# Patient Record
Sex: MALE | Race: WHITE | NOT HISPANIC OR LATINO | Employment: FULL TIME | URBAN - METROPOLITAN AREA
[De-identification: names, ages, dates, MRNs, and addresses within clinical notes are randomized per-mention and may not be internally consistent; named-entity substitution may affect disease eponyms.]

---

## 2019-09-24 ENCOUNTER — HOSPITAL ENCOUNTER (EMERGENCY)
Facility: OTHER | Age: 69
Discharge: HOME | End: 2019-09-24
Attending: EMERGENCY MEDICINE
Payer: COMMERCIAL

## 2019-09-24 VITALS
BODY MASS INDEX: 32.48 KG/M2 | WEIGHT: 214.31 LBS | RESPIRATION RATE: 20 BRPM | HEART RATE: 79 BPM | OXYGEN SATURATION: 95 % | DIASTOLIC BLOOD PRESSURE: 94 MMHG | SYSTOLIC BLOOD PRESSURE: 171 MMHG | HEIGHT: 68 IN | TEMPERATURE: 98 F

## 2019-09-24 DIAGNOSIS — I10 HYPERTENSION: ICD-10-CM

## 2019-09-24 DIAGNOSIS — I16.0 HYPERTENSIVE URGENCY: Primary | ICD-10-CM

## 2019-09-24 LAB
ALBUMIN SERPL BCP-MCNC: 3.8 G/DL (ref 3.5–5.2)
ALP SERPL-CCNC: 72 U/L (ref 55–135)
ALT SERPL W/O P-5'-P-CCNC: 17 U/L (ref 10–44)
ANION GAP SERPL CALC-SCNC: 13 MMOL/L (ref 8–16)
AST SERPL-CCNC: 14 U/L (ref 10–40)
BASOPHILS # BLD AUTO: 0.08 K/UL (ref 0–0.2)
BASOPHILS NFR BLD: 0.9 % (ref 0–1.9)
BILIRUB SERPL-MCNC: 0.7 MG/DL (ref 0.1–1)
BILIRUB UR QL STRIP: NEGATIVE
BNP SERPL-MCNC: 18 PG/ML (ref 0–99)
BUN SERPL-MCNC: 27 MG/DL (ref 8–23)
CALCIUM SERPL-MCNC: 9.6 MG/DL (ref 8.7–10.5)
CHLORIDE SERPL-SCNC: 103 MMOL/L (ref 95–110)
CLARITY UR: CLEAR
CO2 SERPL-SCNC: 23 MMOL/L (ref 23–29)
COLOR UR: YELLOW
CREAT SERPL-MCNC: 1.1 MG/DL (ref 0.5–1.4)
DIFFERENTIAL METHOD: ABNORMAL
EOSINOPHIL # BLD AUTO: 0.2 K/UL (ref 0–0.5)
EOSINOPHIL NFR BLD: 2.4 % (ref 0–8)
ERYTHROCYTE [DISTWIDTH] IN BLOOD BY AUTOMATED COUNT: 12.6 % (ref 11.5–14.5)
EST. GFR  (AFRICAN AMERICAN): >60 ML/MIN/1.73 M^2
EST. GFR  (NON AFRICAN AMERICAN): >60 ML/MIN/1.73 M^2
GLUCOSE SERPL-MCNC: 139 MG/DL (ref 70–110)
GLUCOSE UR QL STRIP: NEGATIVE
HCT VFR BLD AUTO: 50.1 % (ref 40–54)
HGB BLD-MCNC: 16.2 G/DL (ref 14–18)
HGB UR QL STRIP: ABNORMAL
IMM GRANULOCYTES # BLD AUTO: 0.07 K/UL (ref 0–0.04)
IMM GRANULOCYTES NFR BLD AUTO: 0.8 % (ref 0–0.5)
KETONES UR QL STRIP: NEGATIVE
LEUKOCYTE ESTERASE UR QL STRIP: NEGATIVE
LYMPHOCYTES # BLD AUTO: 1.8 K/UL (ref 1–4.8)
LYMPHOCYTES NFR BLD: 21.1 % (ref 18–48)
MCH RBC QN AUTO: 29.7 PG (ref 27–31)
MCHC RBC AUTO-ENTMCNC: 32.3 G/DL (ref 32–36)
MCV RBC AUTO: 92 FL (ref 82–98)
MICROSCOPIC COMMENT: ABNORMAL
MONOCYTES # BLD AUTO: 0.9 K/UL (ref 0.3–1)
MONOCYTES NFR BLD: 9.8 % (ref 4–15)
NEUTROPHILS # BLD AUTO: 5.7 K/UL (ref 1.8–7.7)
NEUTROPHILS NFR BLD: 65 % (ref 38–73)
NITRITE UR QL STRIP: NEGATIVE
NRBC BLD-RTO: 0 /100 WBC
PH UR STRIP: 6 [PH] (ref 5–8)
PLATELET # BLD AUTO: 222 K/UL (ref 150–350)
PMV BLD AUTO: 9.6 FL (ref 9.2–12.9)
POTASSIUM SERPL-SCNC: 4.5 MMOL/L (ref 3.5–5.1)
PROT SERPL-MCNC: 7.3 G/DL (ref 6–8.4)
PROT UR QL STRIP: ABNORMAL
RBC # BLD AUTO: 5.45 M/UL (ref 4.6–6.2)
RBC #/AREA URNS HPF: 5 /HPF (ref 0–4)
SODIUM SERPL-SCNC: 139 MMOL/L (ref 136–145)
SP GR UR STRIP: 1.02 (ref 1–1.03)
SQUAMOUS #/AREA URNS HPF: 1 /HPF
TROPONIN I SERPL DL<=0.01 NG/ML-MCNC: <0.006 NG/ML (ref 0–0.03)
TSH SERPL DL<=0.005 MIU/L-ACNC: 1.31 UIU/ML (ref 0.4–4)
URN SPEC COLLECT METH UR: ABNORMAL
UROBILINOGEN UR STRIP-ACNC: NEGATIVE EU/DL
WBC # BLD AUTO: 8.74 K/UL (ref 3.9–12.7)

## 2019-09-24 PROCEDURE — 93010 ELECTROCARDIOGRAM REPORT: CPT | Mod: ,,, | Performed by: INTERNAL MEDICINE

## 2019-09-24 PROCEDURE — 93005 ELECTROCARDIOGRAM TRACING: CPT

## 2019-09-24 PROCEDURE — 84484 ASSAY OF TROPONIN QUANT: CPT

## 2019-09-24 PROCEDURE — 80053 COMPREHEN METABOLIC PANEL: CPT

## 2019-09-24 PROCEDURE — 99284 EMERGENCY DEPT VISIT MOD MDM: CPT | Mod: 25

## 2019-09-24 PROCEDURE — 84443 ASSAY THYROID STIM HORMONE: CPT

## 2019-09-24 PROCEDURE — 85025 COMPLETE CBC W/AUTO DIFF WBC: CPT

## 2019-09-24 PROCEDURE — 93010 EKG 12-LEAD: ICD-10-PCS | Mod: ,,, | Performed by: INTERNAL MEDICINE

## 2019-09-24 PROCEDURE — 36415 COLL VENOUS BLD VENIPUNCTURE: CPT

## 2019-09-24 PROCEDURE — 25000003 PHARM REV CODE 250: Performed by: EMERGENCY MEDICINE

## 2019-09-24 PROCEDURE — 81000 URINALYSIS NONAUTO W/SCOPE: CPT

## 2019-09-24 PROCEDURE — 83880 ASSAY OF NATRIURETIC PEPTIDE: CPT

## 2019-09-24 RX ORDER — ACETAMINOPHEN 325 MG/1
650 TABLET ORAL
Status: COMPLETED | OUTPATIENT
Start: 2019-09-24 | End: 2019-09-24

## 2019-09-24 RX ORDER — LABETALOL HYDROCHLORIDE 5 MG/ML
10 INJECTION, SOLUTION INTRAVENOUS
Status: DISCONTINUED | OUTPATIENT
Start: 2019-09-24 | End: 2019-09-24

## 2019-09-24 RX ORDER — CLONIDINE HYDROCHLORIDE 0.1 MG/1
0.1 TABLET ORAL
Status: COMPLETED | OUTPATIENT
Start: 2019-09-24 | End: 2019-09-24

## 2019-09-24 RX ORDER — AMLODIPINE BESYLATE 5 MG/1
5 TABLET ORAL DAILY
Qty: 30 TABLET | Refills: 0 | Status: SHIPPED | OUTPATIENT
Start: 2019-09-24 | End: 2020-09-23

## 2019-09-24 RX ADMIN — ACETAMINOPHEN 650 MG: 325 TABLET, FILM COATED ORAL at 09:09

## 2019-09-24 RX ADMIN — CLONIDINE HYDROCHLORIDE 0.1 MG: 0.1 TABLET ORAL at 09:09

## 2019-09-24 NOTE — ED NOTES
Pt to ED with c/o HTN since this AM. PMH of high cholesterol, family history of heart disease. Pt reporting intermittent R sided neck pain with radiation to R elbow for several weeks. Pain is relieved with rest and hot water, exacerbated with with movement. Pt seen by physical therapy this AM, pt reports becoming diaphoretic. Physical therapist check BP, observed SBP in 220s, sent pt to ED for further evaluation. Pt denies dizziness, weakness, SOB, CP, fever/chills, changes in vision, numbness/tingling. Pt connected to continuous cardiac monitoring, pulse ox, BP cycled. Will continue to monitor.     Two patient identifiers have been checked and are correct.      Appearance: Pt awake, alert & oriented to person, place & time. Pt in no acute distress at present time. Pt is clean and well groomed with clothes appropriately fastened.   Skin: Skin warm, dry & intact. Color consistent with ethnicity. Mucous membranes moist. No breakdown or brusing noted.   Musculoskeletal: Patient moving all extremities well, no obvious swelling or deformities noted. Ambulatory to ED bed with steady and even gait.   Respiratory: Respirations spontaneous, even, and non-labored. Visible chest rise noted. Airway is open and patent. No accessory muscle use noted.   Neurologic: Sensation is intact. Speech is clear and appropriate. Eyes open spontaneously, behavior appropriate to situation, follows commands, facial expression symmetrical, bilateral hand grasp equal and even, purposeful motor response noted.  Cardiac: All peripheral pulses present. No Bilateral lower extremity edema. Cap refill is <3 seconds.  Abdomen: Abdomen soft, non-tender to palpation. Pt is obese.   : Pt reports no dysuria or hematuria.

## 2019-09-24 NOTE — ED NOTES
Pt ambulatory to ED restroom to provide urine sample. Pt educated on clean-catch technique, pt verbalized understanding.

## 2019-09-24 NOTE — ED PROVIDER NOTES
Encounter Date: 9/24/2019    SCRIBE #1 NOTE: Bernie BRIGHT am scribing for, and in the presence of, Dr. Noland.       History     Chief Complaint   Patient presents with    Hypertension     Pt sent to Ed per physical therapy for elevated BPs 220/110, 202/199 & diaphresis. Pt denies dizziness, H/A, acute vision changes & extremity numbness. Pt reports that he was as PT for pinched nerve.     Time seen by provider: 8:41 AM    This is a 69 y.o. male who presents from physical therapy with complaint of diaphoresis and elevated blood pressure this morning. He reports that diaphoresis began after long hot shower this morning and persisted until arrival to physical therapy. At his physical therapy appointment, he had an elevated blood pressure of 220/110 and was sent to this ED prior to start of exercise. He states that he saw Dr. Santizo one week ago with three week hx of right shoulder pain and was diagnosed with pinched nerve and recommended for physical therapy, today was to be his first session. He states that pain is radiating to the right upper arm and the right chest - the chest pain is reproducible when he elevates his right arm above shoulder level. He reports some burning sensation in right arm. He was prescribed Tramadol but has not been taking it. He reports that he has not been sleeping well due to pain. He states that heat helps with shoulder pain. He has been taking Tylenol, Ibuprofen, and Aspirin for pain. He reports no left-sided chest pain or palpitations. He denies fever, chills, SOB, N/V, cough, sore throat, rhinorrhea, urinary sx, irregular bowel movements, leg swelling, lightheadedness, headache, dizziness, and vision changes. He is not on any current medications. He does not smoke or use street drugs. He reports that he drinks occasionally. He has no hx of HTN and denies checking his blood pressure regularly.    The history is provided by the patient.     Review of patient's allergies  indicates:  No Known Allergies  Past Medical History:   Diagnosis Date    Family history of ischemic heart disease (IHD)     Hyperlipidemia     Obesity      Past Surgical History:   Procedure Laterality Date    ANKLE SURGERY      KNEE SURGERY       Family History   Problem Relation Age of Onset    Heart disease Father      Social History     Tobacco Use    Smoking status: Former Smoker     Packs/day: 1.00     Years: 20.00     Pack years: 20.00     Types: Cigarettes     Last attempt to quit: 2009     Years since quittin.8   Substance Use Topics    Alcohol use: Yes     Alcohol/week: 1.7 standard drinks     Types: 2 Standard drinks or equivalent per week    Drug use: Never     Review of Systems   Constitutional: Positive for diaphoresis (resolved). Negative for chills and fever.   HENT: Negative for congestion and sore throat.    Eyes: Negative for visual disturbance.   Respiratory: Negative for cough and shortness of breath.    Cardiovascular: Positive for chest pain (right sided). Negative for palpitations and leg swelling.   Gastrointestinal: Negative for abdominal pain, diarrhea, nausea and vomiting.   Genitourinary: Negative for decreased urine volume, dysuria and frequency.   Musculoskeletal: Positive for arthralgias (right shoulder). Negative for joint swelling, neck pain and neck stiffness.   Skin: Negative for rash and wound.   Neurological: Negative for dizziness, weakness, light-headedness, numbness and headaches.   Psychiatric/Behavioral: Negative for behavioral problems and confusion.       Physical Exam     Initial Vitals [19 0739]   BP Pulse Resp Temp SpO2   (!) 228/102 88 18 98.2 °F (36.8 °C) 97 %      MAP       --         Vitals:    19 0840 19 0932 19 1002 19 1032   BP: (!) 204/95 (!) 222/100 (!) 224/103 (!) 165/80   Pulse: 84 82 81 79   Resp: 18 (!) 21 (!) 24 (!) 23   Temp:       TempSrc:       SpO2: 97% 95% 96% (!) 93%   Weight:       Height:         09/24/19 1132   BP: (!) 171/94   Pulse: 79   Resp: 20   Temp:    TempSrc:    SpO2: 95%   Weight:    Height:      Physical Exam    Nursing note and vitals reviewed.  Constitutional: He appears well-developed and well-nourished. No distress.   HENT:   Head: Normocephalic and atraumatic.   Mouth/Throat: Oropharynx is clear and moist.   Eyes: Conjunctivae and EOM are normal. Pupils are equal, round, and reactive to light.   Neck: Normal range of motion. Neck supple.   Cardiovascular: Normal rate, regular rhythm and normal heart sounds.   No murmur heard.  Pulmonary/Chest: Breath sounds normal. No respiratory distress. He has no wheezes. He has no rhonchi. He has no rales.   Abdominal: Soft. Bowel sounds are normal. There is no tenderness.   Musculoskeletal: Normal range of motion.   Neurological: He is alert and oriented to person, place, and time. He has normal strength. No cranial nerve deficit or sensory deficit.   Skin: Skin is warm and dry. No rash noted.   Psychiatric: He has a normal mood and affect. His behavior is normal.         ED Course   Procedures  Labs Reviewed   CBC W/ AUTO DIFFERENTIAL - Abnormal; Notable for the following components:       Result Value    Immature Granulocytes 0.8 (*)     Immature Grans (Abs) 0.07 (*)     All other components within normal limits   COMPREHENSIVE METABOLIC PANEL - Abnormal; Notable for the following components:    Glucose 139 (*)     BUN, Bld 27 (*)     All other components within normal limits   URINALYSIS, REFLEX TO URINE CULTURE - Abnormal; Notable for the following components:    Protein, UA Trace (*)     Occult Blood UA 1+ (*)     All other components within normal limits    Narrative:     Preferred Collection Type->Urine, Clean Catch   URINALYSIS MICROSCOPIC - Abnormal; Notable for the following components:    RBC, UA 5 (*)     All other components within normal limits    Narrative:     Preferred Collection Type->Urine, Clean Catch   B-TYPE NATRIURETIC PEPTIDE    TROPONIN I   TSH   TSH     EKG Readings: (Independently Interpreted)   NSR at a rate of 77. No STEMI. No ectopy.         Medical Decision Making:   History:   Old Medical Records: I decided to obtain old medical records.  Independently Interpreted Test(s):   I have ordered and independently interpreted EKG Reading(s) - see prior notes  Clinical Tests:   Lab Tests: Ordered and Reviewed  Medical Tests: Ordered and Reviewed  ED Management:  Emergent evaluation of 69-year-old male who presents with complaint of elevated blood pressure, diaphoretic at PT or earlier after a long hot shower.  Vital signs do reveal hypertension.  On medical record review, blood pressure values from several years ago show that patient has been hypertensive on previous visits, although not at this level.  Patient states that diaphoresis was definitely related to his shower.  He denies any chest pain or shortness of breath, neurologic symptoms or leg swelling. Physical exam is benign with no evidence of a CVA.  EKG is benign with no evidence of cardiac ischemia.  Screening troponin is negative, I doubt acute MI.  Chest x-ray shows no evidence of heart failure.  Lab work shows a normal creatinine level, but there is trace proteinuria and microscopic hematuria present.  Patient is advised that he is likely had elevated blood pressure for some time.  Blood pressure improved with a single dose of clonidine given in the ED.  He is given prescription for amlodipine and encouraged to follow up with his PCP for blood pressure management.  I do feel some of the elevated blood pressure may be related to shoulder pain. Although this radiates to the chest, it is definitely reproducible with elevating the arm above the shoulder and I do not think it is cardiac.  Ultimately he is discharged in good condition and encouraged close follow-up with PCP.  He is advised to keep a blood pressure log and bring this with him to his appointment.  He is advised to  return to the ED for any new or concerning symptoms.            Scribe Attestation:   Scribe #1: I performed the above scribed service and the documentation accurately describes the services I performed. I attest to the accuracy of the note.    Attending Attestation:           Physician Attestation for Scribe:  Physician Attestation Statement for Scribe #1: I, Dr. Noland, reviewed documentation, as scribed by Bernie Epperson in my presence, and it is both accurate and complete.                    Clinical Impression:     1. Hypertensive urgency    2. Hypertension                                   Ernestina Noland MD  09/25/19 0906

## 2019-09-24 NOTE — ED NOTES
Pt aaox3 skin warm and dry. Blood pressure improved md informed meds canceled . Pt states no change in neck pain.

## 2019-10-04 ENCOUNTER — TELEPHONE (OUTPATIENT)
Dept: INTERNAL MEDICINE | Facility: CLINIC | Age: 69
End: 2019-10-04

## 2019-10-05 NOTE — TELEPHONE ENCOUNTER
----- Message from Azucena Orourke sent at 10/4/2019  4:25 PM CDT -----  Contact: self / [phone]  Patient is requesting a call back regarding, will be a new patient and need an appointment. Please advise

## 2019-10-06 NOTE — TELEPHONE ENCOUNTER
I am happy to see the pt but I suspect I do not have any new pt appointments this week. Is he having any urgent problems? We can arrange an urgent care appointment if needed. Can we get more information?

## 2021-07-01 ENCOUNTER — PATIENT MESSAGE (OUTPATIENT)
Dept: ADMINISTRATIVE | Facility: OTHER | Age: 71
End: 2021-07-01